# Patient Record
Sex: MALE | Race: WHITE | Employment: FULL TIME | URBAN - NONMETROPOLITAN AREA
[De-identification: names, ages, dates, MRNs, and addresses within clinical notes are randomized per-mention and may not be internally consistent; named-entity substitution may affect disease eponyms.]

---

## 2023-06-16 ENCOUNTER — HOSPITAL ENCOUNTER (OUTPATIENT)
Age: 38
Setting detail: OUTPATIENT SURGERY
Discharge: HOME | End: 2023-06-16
Attending: ORTHOPAEDIC SURGERY | Admitting: ORTHOPAEDIC SURGERY
Payer: MEDICAID

## 2023-06-16 ENCOUNTER — APPOINTMENT (OUTPATIENT)
Dept: GENERAL RADIOLOGY | Age: 38
End: 2023-06-16
Attending: ORTHOPAEDIC SURGERY
Payer: MEDICAID

## 2023-06-16 VITALS
SYSTOLIC BLOOD PRESSURE: 135 MMHG | OXYGEN SATURATION: 100 % | WEIGHT: 216 LBS | HEIGHT: 69 IN | TEMPERATURE: 98.7 F | RESPIRATION RATE: 18 BRPM | DIASTOLIC BLOOD PRESSURE: 76 MMHG | BODY MASS INDEX: 31.99 KG/M2 | HEART RATE: 88 BPM

## 2023-06-16 DIAGNOSIS — S93.432A ANKLE SYNDESMOSIS DISRUPTION, LEFT, INITIAL ENCOUNTER: Primary | ICD-10-CM

## 2023-06-16 PROCEDURE — C1713 ANCHOR/SCREW BN/BN,TIS/BN: HCPCS | Performed by: ORTHOPAEDIC SURGERY

## 2023-06-16 PROCEDURE — 3600000004 HC SURGERY LEVEL 4 BASE: Performed by: ORTHOPAEDIC SURGERY

## 2023-06-16 PROCEDURE — 73610 X-RAY EXAM OF ANKLE: CPT

## 2023-06-16 PROCEDURE — 6360000002 HC RX W HCPCS: Performed by: ANESTHESIOLOGY

## 2023-06-16 PROCEDURE — 6370000000 HC RX 637 (ALT 250 FOR IP): Performed by: ORTHOPAEDIC SURGERY

## 2023-06-16 PROCEDURE — 6370000000 HC RX 637 (ALT 250 FOR IP): Performed by: ANESTHESIOLOGY

## 2023-06-16 PROCEDURE — 2580000003 HC RX 258: Performed by: ORTHOPAEDIC SURGERY

## 2023-06-16 PROCEDURE — 7100000010 HC PHASE II RECOVERY - FIRST 15 MIN: Performed by: ORTHOPAEDIC SURGERY

## 2023-06-16 PROCEDURE — 3700000000 HC ANESTHESIA ATTENDED CARE: Performed by: ORTHOPAEDIC SURGERY

## 2023-06-16 PROCEDURE — 3700000001 HC ADD 15 MINUTES (ANESTHESIA): Performed by: ORTHOPAEDIC SURGERY

## 2023-06-16 PROCEDURE — 7100000011 HC PHASE II RECOVERY - ADDTL 15 MIN: Performed by: ORTHOPAEDIC SURGERY

## 2023-06-16 PROCEDURE — 2709999900 HC NON-CHARGEABLE SUPPLY: Performed by: ORTHOPAEDIC SURGERY

## 2023-06-16 PROCEDURE — 3600000014 HC SURGERY LEVEL 4 ADDTL 15MIN: Performed by: ORTHOPAEDIC SURGERY

## 2023-06-16 PROCEDURE — 7100000001 HC PACU RECOVERY - ADDTL 15 MIN: Performed by: ORTHOPAEDIC SURGERY

## 2023-06-16 PROCEDURE — 7100000000 HC PACU RECOVERY - FIRST 15 MIN: Performed by: ORTHOPAEDIC SURGERY

## 2023-06-16 DEVICE — IMPLANTABLE DEVICE: Type: IMPLANTABLE DEVICE | Site: ANKLE | Status: FUNCTIONAL

## 2023-06-16 DEVICE — SYSTEM IMPL S STL KNOTLESS SYNDESMOSIS TIGHTROPE XP: Type: IMPLANTABLE DEVICE | Site: ANKLE | Status: FUNCTIONAL

## 2023-06-16 RX ORDER — ONDANSETRON 4 MG/1
4 TABLET, FILM COATED ORAL EVERY 8 HOURS PRN
Qty: 20 TABLET | Refills: 1 | Status: SHIPPED | OUTPATIENT
Start: 2023-06-16

## 2023-06-16 RX ORDER — PROCHLORPERAZINE EDISYLATE 5 MG/ML
5 INJECTION INTRAMUSCULAR; INTRAVENOUS
Status: DISCONTINUED | OUTPATIENT
Start: 2023-06-16 | End: 2023-06-16 | Stop reason: HOSPADM

## 2023-06-16 RX ORDER — MEPERIDINE HYDROCHLORIDE 25 MG/ML
INJECTION INTRAMUSCULAR; INTRAVENOUS; SUBCUTANEOUS
Status: DISCONTINUED
Start: 2023-06-16 | End: 2023-06-16 | Stop reason: HOSPADM

## 2023-06-16 RX ORDER — SODIUM CHLORIDE 0.9 % (FLUSH) 0.9 %
5-40 SYRINGE (ML) INJECTION EVERY 12 HOURS SCHEDULED
Status: DISCONTINUED | OUTPATIENT
Start: 2023-06-16 | End: 2023-06-16 | Stop reason: HOSPADM

## 2023-06-16 RX ORDER — ASPIRIN 325 MG
325 TABLET ORAL 2 TIMES DAILY
Qty: 42 TABLET | Refills: 0 | Status: SHIPPED | OUTPATIENT
Start: 2023-06-16 | End: 2023-07-07

## 2023-06-16 RX ORDER — ROPIVACAINE HYDROCHLORIDE 5 MG/ML
60 INJECTION, SOLUTION EPIDURAL; INFILTRATION; PERINEURAL ONCE
Status: DISCONTINUED | OUTPATIENT
Start: 2023-06-16 | End: 2023-06-16 | Stop reason: HOSPADM

## 2023-06-16 RX ORDER — FAMOTIDINE 20 MG/1
20 TABLET, FILM COATED ORAL ONCE
Status: COMPLETED | OUTPATIENT
Start: 2023-06-16 | End: 2023-06-16

## 2023-06-16 RX ORDER — FENTANYL CITRATE 50 UG/ML
25 INJECTION, SOLUTION INTRAMUSCULAR; INTRAVENOUS
Status: COMPLETED | OUTPATIENT
Start: 2023-06-16 | End: 2023-06-16

## 2023-06-16 RX ORDER — MEPERIDINE HYDROCHLORIDE 25 MG/ML
12.5 INJECTION INTRAMUSCULAR; INTRAVENOUS; SUBCUTANEOUS EVERY 5 MIN PRN
Status: DISCONTINUED | OUTPATIENT
Start: 2023-06-16 | End: 2023-06-16 | Stop reason: HOSPADM

## 2023-06-16 RX ORDER — FENTANYL CITRATE 50 UG/ML
25 INJECTION, SOLUTION INTRAMUSCULAR; INTRAVENOUS EVERY 5 MIN PRN
Status: DISCONTINUED | OUTPATIENT
Start: 2023-06-16 | End: 2023-06-16 | Stop reason: HOSPADM

## 2023-06-16 RX ORDER — APREPITANT 40 MG/1
40 CAPSULE ORAL ONCE
Status: COMPLETED | OUTPATIENT
Start: 2023-06-16 | End: 2023-06-16

## 2023-06-16 RX ORDER — FENTANYL CITRATE 50 UG/ML
50 INJECTION, SOLUTION INTRAMUSCULAR; INTRAVENOUS EVERY 5 MIN PRN
Status: COMPLETED | OUTPATIENT
Start: 2023-06-16 | End: 2023-06-16

## 2023-06-16 RX ORDER — SODIUM CHLORIDE 9 MG/ML
INJECTION, SOLUTION INTRAVENOUS PRN
Status: DISCONTINUED | OUTPATIENT
Start: 2023-06-16 | End: 2023-06-16 | Stop reason: HOSPADM

## 2023-06-16 RX ORDER — DOCUSATE SODIUM 100 MG/1
100 CAPSULE, LIQUID FILLED ORAL 2 TIMES DAILY
Qty: 60 CAPSULE | Refills: 1 | Status: SHIPPED | OUTPATIENT
Start: 2023-06-16

## 2023-06-16 RX ORDER — FENTANYL CITRATE 50 UG/ML
50 INJECTION, SOLUTION INTRAMUSCULAR; INTRAVENOUS
Status: COMPLETED | OUTPATIENT
Start: 2023-06-16 | End: 2023-06-16

## 2023-06-16 RX ORDER — SODIUM CHLORIDE, SODIUM LACTATE, POTASSIUM CHLORIDE, CALCIUM CHLORIDE 600; 310; 30; 20 MG/100ML; MG/100ML; MG/100ML; MG/100ML
INJECTION, SOLUTION INTRAVENOUS CONTINUOUS
Status: DISCONTINUED | OUTPATIENT
Start: 2023-06-16 | End: 2023-06-16 | Stop reason: HOSPADM

## 2023-06-16 RX ORDER — LIDOCAINE HYDROCHLORIDE 10 MG/ML
1 INJECTION, SOLUTION EPIDURAL; INFILTRATION; INTRACAUDAL; PERINEURAL
Status: COMPLETED | OUTPATIENT
Start: 2023-06-16 | End: 2023-06-16

## 2023-06-16 RX ORDER — ONDANSETRON 2 MG/ML
4 INJECTION INTRAMUSCULAR; INTRAVENOUS
Status: DISCONTINUED | OUTPATIENT
Start: 2023-06-16 | End: 2023-06-16 | Stop reason: HOSPADM

## 2023-06-16 RX ORDER — SODIUM CHLORIDE 0.9 % (FLUSH) 0.9 %
5-40 SYRINGE (ML) INJECTION PRN
Status: DISCONTINUED | OUTPATIENT
Start: 2023-06-16 | End: 2023-06-16 | Stop reason: HOSPADM

## 2023-06-16 RX ORDER — OXYCODONE HYDROCHLORIDE AND ACETAMINOPHEN 5; 325 MG/1; MG/1
1 TABLET ORAL EVERY 4 HOURS PRN
Qty: 50 TABLET | Refills: 0 | Status: SHIPPED | OUTPATIENT
Start: 2023-06-16 | End: 2023-06-24

## 2023-06-16 RX ORDER — OXYCODONE HYDROCHLORIDE AND ACETAMINOPHEN 5; 325 MG/1; MG/1
1 TABLET ORAL
Status: COMPLETED | OUTPATIENT
Start: 2023-06-16 | End: 2023-06-16

## 2023-06-16 RX ORDER — CYCLOBENZAPRINE HCL 10 MG
10 TABLET ORAL 3 TIMES DAILY PRN
Qty: 30 TABLET | Refills: 0 | Status: SHIPPED | OUTPATIENT
Start: 2023-06-16 | End: 2023-06-26

## 2023-06-16 RX ORDER — MIDAZOLAM HYDROCHLORIDE 2 MG/2ML
2 INJECTION, SOLUTION INTRAMUSCULAR; INTRAVENOUS
Status: COMPLETED | OUTPATIENT
Start: 2023-06-16 | End: 2023-06-16

## 2023-06-16 RX ORDER — DIPHENHYDRAMINE HYDROCHLORIDE 50 MG/ML
12.5 INJECTION INTRAMUSCULAR; INTRAVENOUS
Status: DISCONTINUED | OUTPATIENT
Start: 2023-06-16 | End: 2023-06-16 | Stop reason: HOSPADM

## 2023-06-16 RX ADMIN — SODIUM CHLORIDE, SODIUM LACTATE, POTASSIUM CHLORIDE, AND CALCIUM CHLORIDE: 600; 310; 30; 20 INJECTION, SOLUTION INTRAVENOUS at 06:33

## 2023-06-16 RX ADMIN — FENTANYL CITRATE 50 MCG: 50 INJECTION, SOLUTION INTRAMUSCULAR; INTRAVENOUS at 09:52

## 2023-06-16 RX ADMIN — FENTANYL CITRATE 50 MCG: 50 INJECTION, SOLUTION INTRAMUSCULAR; INTRAVENOUS at 09:35

## 2023-06-16 RX ADMIN — FENTANYL CITRATE 50 MCG: 50 INJECTION, SOLUTION INTRAMUSCULAR; INTRAVENOUS at 09:46

## 2023-06-16 RX ADMIN — MIDAZOLAM HYDROCHLORIDE 2 MG: 2 INJECTION, SOLUTION INTRAMUSCULAR; INTRAVENOUS at 07:32

## 2023-06-16 RX ADMIN — FAMOTIDINE 20 MG: 20 TABLET ORAL at 06:55

## 2023-06-16 RX ADMIN — FENTANYL CITRATE 50 MCG: 50 INJECTION, SOLUTION INTRAMUSCULAR; INTRAVENOUS at 09:57

## 2023-06-16 RX ADMIN — MEPERIDINE HYDROCHLORIDE 12.5 MG: 25 INJECTION, SOLUTION INTRAMUSCULAR; INTRAVENOUS; SUBCUTANEOUS at 09:39

## 2023-06-16 RX ADMIN — OXYCODONE HYDROCHLORIDE AND ACETAMINOPHEN 1 TABLET: 5; 325 TABLET ORAL at 10:43

## 2023-06-16 RX ADMIN — APREPITANT 40 MG: 40 CAPSULE ORAL at 06:55

## 2023-06-16 ASSESSMENT — PAIN SCALES - GENERAL
PAINLEVEL_OUTOF10: 7
PAINLEVEL_OUTOF10: 7
PAINLEVEL_OUTOF10: 10
PAINLEVEL_OUTOF10: 7
PAINLEVEL_OUTOF10: 10
PAINLEVEL_OUTOF10: 5
PAINLEVEL_OUTOF10: 7
PAINLEVEL_OUTOF10: 7
PAINLEVEL_OUTOF10: 10
PAINLEVEL_OUTOF10: 9
PAINLEVEL_OUTOF10: 9

## 2023-06-16 ASSESSMENT — PAIN DESCRIPTION - PAIN TYPE
TYPE: SURGICAL PAIN
TYPE: SURGICAL PAIN

## 2023-06-16 ASSESSMENT — PAIN DESCRIPTION - ORIENTATION
ORIENTATION: LEFT

## 2023-06-16 ASSESSMENT — PAIN DESCRIPTION - DESCRIPTORS
DESCRIPTORS: THROBBING

## 2023-06-16 ASSESSMENT — PAIN - FUNCTIONAL ASSESSMENT: PAIN_FUNCTIONAL_ASSESSMENT: 0-10

## 2023-06-16 ASSESSMENT — PAIN DESCRIPTION - LOCATION
LOCATION: ANKLE

## 2023-06-16 NOTE — DISCHARGE INSTRUCTIONS
Lower Extremity Post-op Instructions  Dr. Shala Dominguez      POST-OP CARE: Please follow these instructions closely! Weight Bearing: Your surgery requires that you have the following weight bearing restrictions:   __X__ Non Weight Bearing for __2__ weeks (must use crutches, wheelchair or knee walker)    IMPORTANT PHONE NUMBERS:  For emergencies, please call [phone]  You may reach Dr. Jude Prader or his medical assistant Deborah Bridges at [phone] M-F 8:00am-5:00pm  After 5pm or on the weekends, please call [phone] to reach the doctor on call. Call immediately if you have any of the following symptoms:  Elevated temperature above 101 degrees for more than 48hours after surgery  Persistent drainage  from wound  Severe pain around surgical site  Calf pain  Any signs of infection    Dressings: Do NOT remove dressing/splint unless noted below. SOME DRAINAGE IS NORMAL! DO NOT touch, remove, or apply ointment to the incision and/or steri strips  Signs of infection that warrant a phone call to our clinical line:  Excessive drainage or redness  Red streaking coming away from the incision  Increased pain  Increased temperature above 101 degrees    Sutures: If your physician uses sutures in your knee, they will dissolve on their own and will not need to be removed. Some black sutures occasionally used will need to be removed 10-14 days after surgery. Bathing:    _X_ Keep your splint/dressing clean, dry, intact. Do not place foreign objects inside the splint/dressing. Elevate: Place 2 pillows under your ankle to get the incision area above the level of the heart to help in swelling (a recliner is not elevated!!!)    Ice: Ice your surgery site 5-6 times per day for 20 minutes at a time with dressing in place. You should wait at least 30 minutes before icing again to avoid ice irritation. It may be difficult at first to ice the surgery area due to the amount of dressing, but continue to be diligent with icing.   Your dressings

## 2023-06-16 NOTE — BRIEF OP NOTE
Brief Postoperative Note      Patient: Renata Guthrie  YOB: 1985  MRN: [id number]    Date of Procedure: 6/16/2023    Pre-Op Diagnosis Codes:     * Acute left ankle pain [M25.572]    Post-Op Diagnosis: Same and left ankle syndesmosis disruption       Procedure(s):  LEFT ANKLE SYNDESMOSIS REPAIR    Surgeon(s):  Anette Salomon MD    Assistant:  * No surgical staff found *    Anesthesia: General    Estimated Blood Loss (mL): < 10 mL    Complications: None    Specimens:   * No specimens in log *    Implants:  Implant Name Type Inv. Item Serial No.  Lot No. LRB No. Used Action   TWO HOLE PLATE 2.3KP SS STERILE - [id number]  TWO HOLE PLATE 2.4IF SS STERILE  [employer] [id number] Left 1 Implanted   SYSTEM IMPL S STL KNOTLESS SYNDESMOSIS TIGHTROPE XP - [id number]  SYSTEM IMPL S STL KNOTLESS SYNDESMOSIS TIGHTROPE XP  [employer] [id number] Left 1 Implanted   SYSTEM IMPL S STL KNOTLESS SYNDESMOSIS TIGHTROPE XP - [id number]  SYSTEM IMPL S STL KNOTLESS SYNDESMOSIS TIGHTROPE XP  [employer] [id number] Left 1 Implanted         Drains: * No LDAs found *    Findings: Unstable distal syndesmosis with medial clear space widening and tibiotalar subluxation, left ankle. Stable after syndesmosis reconstruction confirmed on stress testing.       Electronically signed by Anette aSlomon MD on 6/16/2023 at 9:32 AM

## 2023-06-16 NOTE — H&P
secondary to pain. NVI distally. Pain with cotton testing suggesting ligamentous laxity. Impression: Interosseous ligament with syndesmotic disruption of the left lower extremity and ankle. Surgical Plan: Syndesmosis reconstruction of the left ankle.       Electronically signed by Paula Arredondo MD on 6/16/2023 at 7:45 AM

## 2023-06-16 NOTE — PROGRESS NOTES
CLINICAL PHARMACY NOTE: MEDS TO BEDS    Total # of Prescriptions Filled: 5   The following medications were delivered to the patient:  Current Discharge Medication List        START taking these medications    Details   oxyCODONE-acetaminophen (PERCOCET) 5-325 MG per tablet Take 1 tablet by mouth every 4 hours as needed for Pain for up to 8 days. Intended supply: 7 days. Take lowest dose possible to manage pain Max Daily Amount: 6 tablets  Qty: 50 tablet, Refills: 0    Comments: Reduce doses taken as pain becomes manageable  Associated Diagnoses: Ankle syndesmosis disruption, left, initial encounter      cyclobenzaprine (FLEXERIL) 10 MG tablet Take 1 tablet by mouth 3 times daily as needed for Muscle spasms  Qty: 30 tablet, Refills: 0      docusate sodium (COLACE) 100 MG capsule Take 1 capsule by mouth 2 times daily  Qty: 60 capsule, Refills: 1      ondansetron (ZOFRAN) 4 MG tablet Take 1 tablet by mouth every 8 hours as needed for Nausea or Vomiting  Qty: 20 tablet, Refills: 1      aspirin (KAMERON ASPIRIN) 325 MG tablet Take 1 tablet by mouth 2 times daily for 21 days  Qty: 42 tablet, Refills: 0               Additional Documentation:    Handed scripts to patients family and patient was alert and aware. Zero copay. Dispensed free Narcan to patient.

## 2023-06-16 NOTE — OP NOTE
Patient Name: Joshua Wright  MRN: [id number]  : 1985    DATE of SURGERY: 2023    SURGEON: Simmie Krabbe, MD    ASSISTANT: NONE     PREOPERATIVE DIAGNOSES:  1. Acute traumatic minimally-displaced fracture of the proximal left fibula, initial encounter for closed fracture. 2.  Syndesmosis disruption of the left ankle     POSTOPERATIVE DIAGNOSES:  1. Acute traumatic minimally-displaced fracture of the proximal left fibula, initial encounter for closed fracture. 2.  Syndesmosis disruption of the left ankle     PROCEDURES PERFORMED:  1. Syndesmosis reconstruction of the left ankle. 2.  Syndesmosis stress testing under fluoroscopy of the left ankle. IMPLANTS: Arthrex 2 hole syndesmosis plate, Arthrex syndesmosis tight rope x2. ANESTHESIA USED:  General endotracheal anesthesia with regional block. INDICATIONS:  The patient is a 44 y.o. male who sustained a mechanical fall resulting in the above injury and widening of the distal tibiotalar articulation with lateral subluxation of the talus consistent with a syndesmosis disruption. Risks included to that of anesthesia, bleeding, infection, pain, damage to local structures, need for further surgery, instability, prominent hardware and loss of fixation. ESTIMATED BLOOD LOSS:  Less than 10 mL. SPECIMEN:  None. DRAINS:  None. COMPLICATIONS:  None. PROCEDURE IN DETAIL:  The patient was seen in the preoperative holding room, and once again the informed consent form was reviewed with the patient and signed. The site of surgery was marked with his agreement. Anesthesia team then administered a regional block. He was transported to the operating room where a timeout was performed identifying the correct patient as well as the operative site. A 2 g of IV Kefzol was given as perioperative antibiotics. The left lower extremity was prepped and draped in the usual sterile fashion.       Attention was turned to the lateral aspect of the

## (undated) DEVICE — APPLICATOR MEDICATED 26 CC TINT HI-LITE ORNG STRL CHLORAPREP

## (undated) DEVICE — BANDAGE COMPR W6INXL10YD ST M E WHITE/BEIGE

## (undated) DEVICE — GLOVE SURG SZ 9 L12IN FNGR THK13MIL BRN LTX SYN POLYMER W

## (undated) DEVICE — SUTURE VCRL SZ 3-0 L27IN ABSRB UD L26MM SH 1/2 CIR J416H

## (undated) DEVICE — GLOVE SURG SZ 85 L12IN FNGR THK94MIL TRNSLUC YEL LTX

## (undated) DEVICE — SOLUTION IRRIG 1000ML 0.9% SOD CHL USP POUR PLAS BTL

## (undated) DEVICE — ADHESIVE SKIN CLOSURE WND 8.661X1.5 IN 22 CM LIQUIBAND SECUR

## (undated) DEVICE — SURGICAL PROCEDURE PACK LOWER EXTREMITY LOURDES HOSP

## (undated) DEVICE — BANDAGE COMPR W3INXL15FT BGE E SGL LAYERED CLP CLSR

## (undated) DEVICE — GOWN,PREVENTION PLUS,XLN/2XL,ST,22/CS: Brand: MEDLINE

## (undated) DEVICE — SOLUTION IV 1000ML 0.9% SOD CHL FOR IRRIG PLAS CONT

## (undated) DEVICE — LARYNGOSCOPE VID MILLER 2 MTL BLADE M HNDL CURAPLEX

## (undated) DEVICE — ZIMMER® STERILE DISPOSABLE TOURNIQUET CUFF WITH PLC, DUAL PORT, SINGLE BLADDER, 34 IN. (86 CM)

## (undated) DEVICE — SUTURE VCRL SZ 2-0 L36IN ABSRB UD L36MM CT-1 1/2 CIR J945H

## (undated) DEVICE — C-ARM: Brand: UNBRANDED

## (undated) DEVICE — TUBE ET 8MM NSL ORAL BASIC CUF INTMED MURPHY EYE RADPQ MRK

## (undated) DEVICE — C-ARMOR C-ARM EQUIPMENT COVERS CLEAR STERILE UNIVERSAL FIT 12 PER CASE: Brand: C-ARMOR